# Patient Record
Sex: MALE | Race: WHITE | NOT HISPANIC OR LATINO
[De-identification: names, ages, dates, MRNs, and addresses within clinical notes are randomized per-mention and may not be internally consistent; named-entity substitution may affect disease eponyms.]

---

## 2020-01-06 PROBLEM — Z00.00 ENCOUNTER FOR PREVENTIVE HEALTH EXAMINATION: Status: ACTIVE | Noted: 2020-01-06

## 2020-01-08 ENCOUNTER — APPOINTMENT (OUTPATIENT)
Dept: PAIN MANAGEMENT | Facility: CLINIC | Age: 70
End: 2020-01-08
Payer: MEDICARE

## 2020-01-08 VITALS
DIASTOLIC BLOOD PRESSURE: 70 MMHG | HEIGHT: 69 IN | SYSTOLIC BLOOD PRESSURE: 106 MMHG | WEIGHT: 212 LBS | BODY MASS INDEX: 31.4 KG/M2

## 2020-01-08 DIAGNOSIS — I25.2 OLD MYOCARDIAL INFARCTION: ICD-10-CM

## 2020-01-08 DIAGNOSIS — Z82.49 FAMILY HISTORY OF ISCHEMIC HEART DISEASE AND OTHER DISEASES OF THE CIRCULATORY SYSTEM: ICD-10-CM

## 2020-01-08 DIAGNOSIS — Z80.9 FAMILY HISTORY OF MALIGNANT NEOPLASM, UNSPECIFIED: ICD-10-CM

## 2020-01-08 DIAGNOSIS — Z87.891 PERSONAL HISTORY OF NICOTINE DEPENDENCE: ICD-10-CM

## 2020-01-08 DIAGNOSIS — Z86.79 PERSONAL HISTORY OF OTHER DISEASES OF THE CIRCULATORY SYSTEM: ICD-10-CM

## 2020-01-08 DIAGNOSIS — Z78.9 OTHER SPECIFIED HEALTH STATUS: ICD-10-CM

## 2020-01-08 PROCEDURE — 99204 OFFICE O/P NEW MOD 45 MIN: CPT

## 2020-01-08 RX ORDER — ASPIRIN 81 MG
81 TABLET, DELAYED RELEASE (ENTERIC COATED) ORAL
Refills: 0 | Status: ACTIVE | COMMUNITY

## 2020-01-08 RX ORDER — EZETIMIBE 10 MG/1
10 TABLET ORAL
Refills: 0 | Status: ACTIVE | COMMUNITY

## 2020-01-08 RX ORDER — ROSUVASTATIN CALCIUM 40 MG/1
40 TABLET, FILM COATED ORAL
Refills: 0 | Status: ACTIVE | COMMUNITY

## 2020-01-08 RX ORDER — FUROSEMIDE 80 MG/1
TABLET ORAL
Refills: 0 | Status: ACTIVE | COMMUNITY

## 2020-01-08 RX ORDER — SACUBITRIL AND VALSARTAN 49; 51 MG/1; MG/1
TABLET, FILM COATED ORAL
Refills: 0 | Status: ACTIVE | COMMUNITY

## 2020-01-08 RX ORDER — CARVEDILOL 12.5 MG/1
12.5 TABLET, FILM COATED ORAL
Refills: 0 | Status: ACTIVE | COMMUNITY

## 2020-01-08 RX ORDER — GABAPENTIN 300 MG/1
300 CAPSULE ORAL 3 TIMES DAILY
Qty: 90 | Refills: 2 | Status: ACTIVE | COMMUNITY
Start: 2020-01-08 | End: 1900-01-01

## 2020-01-08 RX ORDER — PANTOPRAZOLE 40 MG/1
40 TABLET, DELAYED RELEASE ORAL
Refills: 0 | Status: ACTIVE | COMMUNITY

## 2020-01-08 RX ORDER — CLOPIDOGREL BISULFATE 75 MG/1
75 TABLET, FILM COATED ORAL
Refills: 0 | Status: ACTIVE | COMMUNITY

## 2020-01-08 NOTE — HISTORY OF PRESENT ILLNESS
[___ mths] : [unfilled] month(s) ago [Constant] : constant [Dull] : dull [5] : a maximum pain level of 5/10 [Aching] : aching [Throbbing] : throbbing [Shooting] : shooting [Electric] : electric [FreeTextEntry1] : 69 yom w/ complex medical history (HF, AICD, PTSD, on Plavix/ASA) presents w/ neck and bilateral arm pain. Reports the pain began in Septemer 2019. The pain has been worsening. Quality of life is significantly impaired pain radiates down both arms into the hands, left far greater than right. He reports that he cannot take steroids as the cause heart failure for him in August. His pain is currently 5/10 in intensity. There is weakness, numbness, and tingling. Quality of life is significantly impaired. He has taken oxycodone without relief. [FreeTextEntry3] : none [FreeTextEntry7] : Neck and shoulder pain [FreeTextEntry4] : none

## 2020-01-08 NOTE — REVIEW OF SYSTEMS
[Joint Pain] : joint pain [Anxiety] : anxiety [Depression] : depression [Easy Bleeding] : a tendency for easy bleeding [Easy Bruising] : a tendency for easy bruising [Negative] : Endocrine [FreeTextEntry5] : sob [FreeTextEntry2] : fatigue [de-identified] : rash

## 2020-01-08 NOTE — ASSESSMENT
[FreeTextEntry1] : 69 yom w/ complex medical history (HF, AICD, PTSD, on Plavix/ASA) presents w/ neck and bilateral arm pain. Quality of life is significantly impaired pain radiates down both arms into the hands, left far greater than right. He reports that he cannot take steroids as the cause heart failure for him in August. I have personally reviewed the patient's CT in detail and discussed it with them which is significant for severe left foraminal stenosis at C3-C4. The patient has failed to have relief with medication management and physical therapy. Given the patients failure to improve with all other conservative measures, recommend C7-T1 interlaminar epidural steroid injection under fluoroscopic guidance. The patient will follow-up with me in my office two weeks following intervention. He will discuss with his cardiologist today whether the risks of stopping plavix and aspirin for 7 days outweigh the potential benefits of the procedure, or whether we should proceed at this time. Start gabapentin 300 mg TID.\par

## 2020-01-08 NOTE — DATA REVIEWED
[FreeTextEntry1] : CT Cervical spine (01/06/20):\par C2-C3: Unremarkable\par C3-C4: Facet arthropathy greater on the left with severe left foraminal stenosis.\par C4-C5: Mild right foraminal stenosis.\par C5-C6: Moderate bilateral foraminal stenosis.\par C6-C7: Mild bilateral foraminal stenosis.\par C7-T1: Unremarkable\par \par No significant central stenosis\par

## 2020-01-08 NOTE — PHYSICAL EXAM
[de-identified] : Constitutional: Well-developed, in no acute distress\par Eyes: Pupil- Right: normal, Left: normal\par Neck exam: Inspection normal\par Respiratory: Normal effort, speaking in full sentences\par Cardiovascular: Regular rate and rhythm\par Vascular: Dorsal pedis pulses normal and equal bilaterally\par Abdomen: Inspection normal, no distension\par Skin: Inspection normal, no bruising noted\par Musculoskeletal:\par Cervical Spine:   \par Gait: Antalgic\par Inspection: Normal curvature, no abnormal kyphosis or scoliosis\par \par Facet loading: pain bilaterally\par \par Palpation:\par Cervical paraspinal muscles: pain bilaterally\par 		\par Muscle Strength:\par Deltoid: 5/5 bilaterally\par Biceps: 5/5 bilaterally\par Triceps: 5/5 bilaterally\par Adductor pollicis: 5/5 bilaterally\par \par Sensation: normal and equal in bilateral upper extremities\par \par Reflexes:\par Biceps (C5): 2+ bilaterally\par Brachioradialis (C6): 2+ bilaterally\par Triceps (C7): 2+ bilaterally\par \par Extremity: no edema noted\par Neurological: Memory normal, AAO x 3, Cranial nerves II - XII grossly normal\par Psychiatric: Appropriate mood and affect, oriented to time, place, person, and situation\par \par \par

## 2020-01-08 NOTE — CONSULT LETTER
[Consult Letter:] : I had the pleasure of evaluating your patient, [unfilled]. [Dear  ___] : Dear  [unfilled], [Please see my note below.] : Please see my note below. [Consult Closing:] : Thank you very much for allowing me to participate in the care of this patient.  If you have any questions, please do not hesitate to contact me. [Sincerely,] : Sincerely, [FreeTextEntry3] : Mesfin Daugherty MD\par

## 2020-01-22 ENCOUNTER — APPOINTMENT (OUTPATIENT)
Dept: PAIN MANAGEMENT | Facility: HOSPITAL | Age: 70
End: 2020-01-22

## 2020-01-22 ENCOUNTER — RESULT REVIEW (OUTPATIENT)
Age: 70
End: 2020-01-22

## 2020-02-24 ENCOUNTER — APPOINTMENT (OUTPATIENT)
Dept: PAIN MANAGEMENT | Facility: CLINIC | Age: 70
End: 2020-02-24

## 2020-09-30 ENCOUNTER — APPOINTMENT (OUTPATIENT)
Dept: PAIN MANAGEMENT | Facility: CLINIC | Age: 70
End: 2020-09-30

## 2020-11-19 ENCOUNTER — APPOINTMENT (OUTPATIENT)
Dept: PAIN MANAGEMENT | Facility: CLINIC | Age: 70
End: 2020-11-19
Payer: MEDICARE

## 2020-11-19 PROCEDURE — 99214 OFFICE O/P EST MOD 30 MIN: CPT | Mod: 95

## 2020-11-19 RX ORDER — TIZANIDINE 2 MG/1
2 TABLET ORAL
Qty: 30 | Refills: 0 | Status: ACTIVE | COMMUNITY
Start: 2020-11-19 | End: 1900-01-01

## 2020-11-19 NOTE — ASSESSMENT
[FreeTextEntry1] : 70 yom w/ complex medical history (HF, AICD, PTSD, on Plavix/ASA) presents w/ neck and bilateral arm pain now s/p TAVR and just home. He had excellent relief from his previous RAJINDER. Quality of life is significantly impaired pain radiates down both arms into the hands, left far greater than right. He reports that he cannot take steroids as the cause heart failure for him in August. I have personally reviewed the patient's CT in detail and discussed it with them which is significant for severe left foraminal stenosis at C3-C4. At this time, given recent surgery, no role for intervention, likely for 6 months to a year. Start tizanidine 2 mg TID prn. RTC one month to assess response.\par

## 2020-11-19 NOTE — REVIEW OF SYSTEMS
[Joint Pain] : joint pain [Anxiety] : anxiety [Depression] : depression [Easy Bleeding] : a tendency for easy bleeding [Easy Bruising] : a tendency for easy bruising [Negative] : Endocrine [FreeTextEntry2] : fatigue [FreeTextEntry5] : sob [de-identified] : rash

## 2020-11-19 NOTE — HISTORY OF PRESENT ILLNESS
[___ mths] : [unfilled] month(s) ago [Constant] : constant [5] : a maximum pain level of 5/10 [Dull] : dull [Aching] : aching [Throbbing] : throbbing [Shooting] : shooting [Electric] : electric [FreeTextEntry1] : As per my note dated 01/08/20: "69 yom w/ complex medical history (HF, AICD, PTSD, on Plavix/ASA) presents w/ neck and bilateral arm pain. Reports the pain began in Septemer 2019. The pain has been worsening. Quality of life is significantly impaired pain radiates down both arms into the hands, left far greater than right. He reports that he cannot take steroids as the cause heart failure for him in August. His pain is currently 5/10 in intensity. There is weakness, numbness, and tingling. Quality of life is significantly impaired. He has taken oxycodone without relief."\par \par He cannot sleep due to pain. He gets numb in both hands, left worse then the right. His left shoulder pain remains severe. He recently underwent TAVR. He does have a history of left shoulder surgery. He did have excellent relief after his RAJINDER.\par \par Interventions:\par C7-T1 interlaminar RAJINDER (01/22/20): [FreeTextEntry7] : Neck and shoulder pain [FreeTextEntry3] : none [FreeTextEntry4] : none

## 2020-12-30 ENCOUNTER — APPOINTMENT (OUTPATIENT)
Dept: PAIN MANAGEMENT | Facility: CLINIC | Age: 70
End: 2020-12-30
Payer: MEDICARE

## 2020-12-30 DIAGNOSIS — M54.12 RADICULOPATHY, CERVICAL REGION: ICD-10-CM

## 2020-12-30 DIAGNOSIS — M25.511 PAIN IN RIGHT SHOULDER: ICD-10-CM

## 2020-12-30 DIAGNOSIS — M25.512 PAIN IN RIGHT SHOULDER: ICD-10-CM

## 2020-12-30 DIAGNOSIS — M79.10 MYALGIA, UNSPECIFIED SITE: ICD-10-CM

## 2020-12-30 DIAGNOSIS — M47.812 SPONDYLOSIS W/OUT MYELOPATHY OR RADICULOPATHY, CERVICAL REGION: ICD-10-CM

## 2020-12-30 PROCEDURE — 99214 OFFICE O/P EST MOD 30 MIN: CPT | Mod: 95

## 2020-12-30 NOTE — ASSESSMENT
[FreeTextEntry1] : 70 yom w/ complex medical history (HF, AICD, PTSD, on Plavix/ASA) presents w/ neck and bilateral arm pain now s/p TAVR  and doing well. He had excellent relief from his previous RAJINDER. Quality of life is significantly impaired pain radiates down both arms into the hands, left far greater than right. He has been cleared by his cardiologist for steroid injection. Given his failure to improve with all other conservative measures recommend bilateral shoulder intraarticular steroid injection under ultrasound guidance. RTC two weeks after injection. He can continue Plavix through this. \par

## 2020-12-30 NOTE — HISTORY OF PRESENT ILLNESS
[___ mths] : [unfilled] month(s) ago [Constant] : constant [8] : a current pain level of 8/10 [7] : an average pain level of 7/10 [6] : a minimum pain level of 6/10 [9] : a maximum pain level of 9/10 [Dull] : dull [Aching] : aching [Throbbing] : throbbing [Shooting] : shooting [Electric] : electric [FreeTextEntry1] : Verbal consent was given by/on: Oswald Dorado on 12/30/20\par \par Reason for Telehealth visit: Shoulder pain\par \par This call took place with Qlue room on video. The patient and Dr. Daugherty were both able to see each other and communicate through video. Greater then 50% of the time spent in the encounter involved counseling and coordination of care. He has severe bilateral shoulder pain. The pain keeps him up at night. His left shoulder is worse then his right. His neck pain has improved. \par \par Time spent on visit: 30 minutes\par \par As per my note dated 01/08/20: "69 yom w/ complex medical history (HF, AICD, PTSD, on Plavix/ASA) presents w/ neck and bilateral arm pain. Reports the pain began in Septemer 2019. The pain has been worsening. Quality of life is significantly impaired pain radiates down both arms into the hands, left far greater than right. He reports that he cannot take steroids as the cause heart failure for him in August. His pain is currently 5/10 in intensity. There is weakness, numbness, and tingling. Quality of life is significantly impaired. He has taken oxycodone without relief."\par \par He cannot sleep due to pain. He gets numb in both hands, left worse then the right. His left shoulder pain remains severe. He recently underwent TAVR. He does have a history of left shoulder surgery. He did have excellent relief after his RAJINDER.\par \par Interventions:\par C7-T1 interlaminar RAJINDER (01/22/20): [FreeTextEntry7] : Neck and shoulder pain [FreeTextEntry3] : none [FreeTextEntry4] : none

## 2020-12-30 NOTE — REVIEW OF SYSTEMS
[Joint Pain] : joint pain [Anxiety] : anxiety [Depression] : depression [Easy Bleeding] : a tendency for easy bleeding [Easy Bruising] : a tendency for easy bruising [Negative] : Endocrine [FreeTextEntry2] : fatigue [FreeTextEntry5] : sob [de-identified] : rash

## 2021-01-03 ENCOUNTER — RESULT REVIEW (OUTPATIENT)
Age: 71
End: 2021-01-03

## 2021-01-06 ENCOUNTER — APPOINTMENT (OUTPATIENT)
Dept: PAIN MANAGEMENT | Facility: HOSPITAL | Age: 71
End: 2021-01-06

## 2021-06-09 ENCOUNTER — APPOINTMENT (OUTPATIENT)
Dept: PAIN MANAGEMENT | Facility: CLINIC | Age: 71
End: 2021-06-09

## 2022-01-21 ENCOUNTER — APPOINTMENT (OUTPATIENT)
Dept: PAIN MANAGEMENT | Facility: CLINIC | Age: 72
End: 2022-01-21